# Patient Record
Sex: FEMALE | Race: WHITE | NOT HISPANIC OR LATINO | Employment: FULL TIME | URBAN - NONMETROPOLITAN AREA
[De-identification: names, ages, dates, MRNs, and addresses within clinical notes are randomized per-mention and may not be internally consistent; named-entity substitution may affect disease eponyms.]

---

## 2019-11-12 ENCOUNTER — HOSPITAL ENCOUNTER (OUTPATIENT)
Dept: GENERAL RADIOLOGY | Facility: HOSPITAL | Age: 59
Discharge: HOME | End: 2019-11-12
Admitting: NURSE PRACTITIONER

## 2019-11-12 PROCEDURE — 73140 X-RAY EXAM OF FINGER(S): CPT

## 2021-10-08 ENCOUNTER — TELEPHONE (OUTPATIENT)
Dept: NEUROSURGERY | Age: 61
End: 2021-10-08

## 2021-10-11 ENCOUNTER — TELEPHONE (OUTPATIENT)
Dept: NEUROSURGERY | Age: 61
End: 2021-10-11

## 2021-10-11 NOTE — TELEPHONE ENCOUNTER
[facility] New Patient Questionnaire    1. Diagnosis/Reason for Referral?    Back pain    2. Who is completing questionnaire? Patient  x Caregiver Family      3. Has the patient had any previous spinal/brain surgeries? no        A. If yes, what is the name of the facility in which the surgery was performed? B. Procedure/Surgery performed? C. Who was the surgeon? D. When was the surgery? MM/YY       E. Did the patient improve after the surgery? 4. Is this a second opinion? If yes, Dr. Thu Valverde would like to review patient first before making the appointment. 5. Have MRI Images been obtain within the last year? Yes x No      XR  CT     If yes, where was the imaging performed? [facility]     If yes, what part of the body? Lumbar x Cervical  Thoracic  Brain     If yes, when was it obtained? 9-30-21    Note: if the scan was performed at a facility other than [facility], the disc will need to be brought to the appointment or we need to reach out to obtain the disc. A. Was the patient instructed to provide the disc? Yes  x No      8. Has the patient had a NCV/EMG within the last year? Yes  No x     If yes, where was it performed and date? MM/YY  Location:      9. Has the patient been to Physical Therapy? Yes x  No      If yes, what location, how long attended, and last visit? Location:  [facility]        Therapy Lasted: 6 visits   Date of Last Visit: 2 weeks ago from 10-11-21      10. Has the patient been to Pain Management? Yes x  No     If yes, what location and last visit     Location: [facility]   Last Visit: October 6 ,2021        Is it Helping? NO   Is it helping?

## 2021-10-14 ENCOUNTER — OFFICE VISIT (OUTPATIENT)
Dept: NEUROSURGERY | Age: 61
End: 2021-10-14
Payer: COMMERCIAL

## 2021-10-14 ENCOUNTER — TELEPHONE (OUTPATIENT)
Dept: NEUROSURGERY | Age: 61
End: 2021-10-14

## 2021-10-14 VITALS
SYSTOLIC BLOOD PRESSURE: 113 MMHG | HEART RATE: 90 BPM | BODY MASS INDEX: 23.55 KG/M2 | WEIGHT: 128 LBS | HEIGHT: 62 IN | DIASTOLIC BLOOD PRESSURE: 74 MMHG

## 2021-10-14 DIAGNOSIS — Z01.818 PRE-OP EXAMINATION: ICD-10-CM

## 2021-10-14 DIAGNOSIS — M79.651 RIGHT THIGH PAIN: ICD-10-CM

## 2021-10-14 DIAGNOSIS — M43.16 SPONDYLOLISTHESIS AT L4-L5 LEVEL: ICD-10-CM

## 2021-10-14 DIAGNOSIS — M79.18 RIGHT BUTTOCK PAIN: ICD-10-CM

## 2021-10-14 DIAGNOSIS — M48.061 STENOSIS OF LATERAL RECESS OF LUMBAR SPINE: Primary | ICD-10-CM

## 2021-10-14 DIAGNOSIS — M51.36 DDD (DEGENERATIVE DISC DISEASE), LUMBAR: ICD-10-CM

## 2021-10-14 DIAGNOSIS — M48.061 SPINAL STENOSIS OF LUMBAR REGION WITHOUT NEUROGENIC CLAUDICATION: ICD-10-CM

## 2021-10-14 PROCEDURE — 99205 OFFICE O/P NEW HI 60 MIN: CPT | Performed by: NURSE PRACTITIONER

## 2021-10-14 RX ORDER — METOPROLOL SUCCINATE 50 MG/1
100 TABLET, EXTENDED RELEASE ORAL DAILY
COMMUNITY

## 2021-10-14 RX ORDER — LISINOPRIL 20 MG/1
20 TABLET ORAL DAILY
COMMUNITY

## 2021-10-14 RX ORDER — CYCLOBENZAPRINE HCL 5 MG
5 TABLET ORAL 3 TIMES DAILY PRN
COMMUNITY
End: 2021-10-27

## 2021-10-14 RX ORDER — HYDROCODONE BITARTRATE AND ACETAMINOPHEN 5; 325 MG/1; MG/1
1 TABLET ORAL 3 TIMES DAILY
COMMUNITY
End: 2021-10-27

## 2021-10-14 RX ORDER — TRAMADOL HYDROCHLORIDE 50 MG/1
50 TABLET ORAL EVERY 6 HOURS PRN
COMMUNITY

## 2021-10-14 RX ORDER — CITALOPRAM 20 MG/1
20 TABLET ORAL DAILY
COMMUNITY

## 2021-10-14 RX ORDER — DICLOFENAC SODIUM 75 MG/1
75 TABLET, DELAYED RELEASE ORAL 2 TIMES DAILY
COMMUNITY

## 2021-10-14 RX ORDER — GABAPENTIN 300 MG/1
300 CAPSULE ORAL 2 TIMES DAILY
COMMUNITY

## 2021-10-14 RX ORDER — OMEPRAZOLE 20 MG/1
20 CAPSULE, DELAYED RELEASE ORAL DAILY
COMMUNITY

## 2021-10-14 RX ORDER — SODIUM CHLORIDE 0.9 % (FLUSH) 0.9 %
10 SYRINGE (ML) INJECTION EVERY 12 HOURS SCHEDULED
Status: CANCELLED | OUTPATIENT
Start: 2021-10-14

## 2021-10-14 RX ORDER — AMITRIPTYLINE HYDROCHLORIDE 75 MG/1
75 TABLET, FILM COATED ORAL NIGHTLY
COMMUNITY

## 2021-10-14 RX ORDER — SODIUM CHLORIDE 9 MG/ML
25 INJECTION, SOLUTION INTRAVENOUS PRN
Status: CANCELLED | OUTPATIENT
Start: 2021-10-14

## 2021-10-14 RX ORDER — ATORVASTATIN CALCIUM 20 MG/1
20 TABLET, FILM COATED ORAL DAILY
COMMUNITY

## 2021-10-14 RX ORDER — SODIUM CHLORIDE 0.9 % (FLUSH) 0.9 %
10 SYRINGE (ML) INJECTION PRN
Status: CANCELLED | OUTPATIENT
Start: 2021-10-14

## 2021-10-14 RX ORDER — TEMAZEPAM 15 MG/1
15 CAPSULE ORAL NIGHTLY PRN
COMMUNITY

## 2021-10-14 ASSESSMENT — ENCOUNTER SYMPTOMS
EYES NEGATIVE: 1
GASTROINTESTINAL NEGATIVE: 1
RESPIRATORY NEGATIVE: 1
BACK PAIN: 1

## 2021-10-14 NOTE — H&P
[facility]  H&P      Chief Complaint   Patient presents with    Follow-up     back pain. HISTORY OF PRESENT ILLNESS:    Tiffany Oropeza is a 56 y.o. female who presents with right buttock pain that has been ongoing for about a year and a half but worsening. The pain does radiate into the RIGHT anterior thigh. Her pain is mostly located in the RLE. No real back pain. The patient denies numbness. Any sort of physical activity exacerbates the pain. Her pain is worsened when going from a seated to standing position. Her pain is worsened with walking. Her pain is not changed when lying flat. Overall, indicative that the patient does not have a mechanical nature to their pain. The patient states that she can no longer walk for prolonged periods of time which has dramatically affected her quality of life. The patient has underwent a non-operative treatment course that has included:  NSAIDs (diclofenac)  Muscle Relaxers (flexeril)  Gabapentin  Opiates (tramadol)  Physical Therapy   Epidural Steroid Injections   Chiropractic therapy       Of note she does not use tobacco and does take blood thinning medications (ASA). History reviewed. No pertinent past medical history. History reviewed. No pertinent surgical history.     Current Outpatient Medications   Medication Sig Dispense Refill    metoprolol succinate (TOPROL XL) 50 MG extended release tablet Take 100 mg by mouth 2 times daily      omeprazole (PRILOSEC) 20 MG delayed release capsule Take 20 mg by mouth daily      lisinopril (PRINIVIL;ZESTRIL) 20 MG tablet Take 20 mg by mouth daily      diclofenac (VOLTAREN) 75 MG EC tablet Take 75 mg by mouth 2 times daily      citalopram (CELEXA) 20 MG tablet Take 20 mg by mouth daily      atorvastatin (LIPITOR) 20 MG tablet Take 20 mg by mouth daily      amitriptyline (ELAVIL) 75 MG tablet Take 75 mg by mouth nightly      traMADol (ULTRAM) 50 MG tablet Take 50 mg by mouth every 6 hours as needed for Pain.  temazepam (RESTORIL) 15 MG capsule Take 15 mg by mouth nightly as needed for Sleep.  gabapentin (NEURONTIN) 300 MG capsule Take 300 mg by mouth 2 times daily.  HYDROcodone-acetaminophen (NORCO) 5-325 MG per tablet Take 1 tablet by mouth three times daily.  cyclobenzaprine (FLEXERIL) 5 MG tablet Take 5 mg by mouth 3 times daily as needed for Muscle spasms       No current facility-administered medications for this visit. Allergies:  Patient has no known allergies. Social History:   Social History     Tobacco Use   Smoking Status Never Smoker   Smokeless Tobacco Never Used     Social History     Substance and Sexual Activity   Alcohol Use None         Family History:   History reviewed. No pertinent family history. REVIEW OF SYSTEMS:  Constitutional: Negative. HENT: Negative. Eyes: Negative. Respiratory: Negative. Cardiovascular: Negative. Gastrointestinal: Negative. Genitourinary: Negative. Musculoskeletal: Positive for back pain. Skin: Negative. Neurological: Negative. Endo/Heme/Allergies: Negative. Psychiatric/Behavioral: Negative. PHYSICAL EXAM:  Vitals:    10/14/21 1314   BP: 113/74   Pulse: 90     Constitutional: appears well-developed and well-nourished. Eyes - conjunctiva normal.  Pupils react to light  Ear, nose, throat - hearing intact to finger rub, No scars, masses, or lesions over external nose or ears, no atrophy oftongue  Neck- symmetric, no masses noted, no jugular vein distension  Respiration- chest wall appears symmetric, good expansion, normal effort without use of accessory muscles  Musculoskeletal - no significant wasting of muscles noted, no bony deformities, gait no gross ataxia  Extremities- no clubbing, cyanosis oredema  Skin - warm, dry, and intact. No rash, erythema, or pallor.   Psychiatric - mood, affect, and behavior appear normal.     Neurologic Examination  Awake, Alert and oriented x 4  Normal speech pattern, following commands    Motor:  RIGHT:     iliopsoas 5/5    knee flexor 5/5    knee extension 5/5    EHL/dorsiflexion 5/5    plantar flexion 5/5    LEFT:       iliopsoas 5/5    knee flexor 5/5    knee extension 5/5    EHL/dorsiflexion 5/5    plantar flexion 5/5    No deficits to light touch or pinprick sensation  Reflexes are 2+ and symmetric  No myofacial tenderness to palpation  Normal Gait pattern        DATA and IMAGING:    Nursing/pcp notes, imaging, labs, and vitals reviewed. PT,OT and/or speech notes reviewed    No results found for: WBC, HGB, HCT, MCV, PLTNo results found for: NA, K, CL, CO2, BUN, CREATININE, GLUCOSE, CALCIUM, PROT, LABALBU, BILITOT, ALKPHOS, AST, ALT, LABGLOM, GFRAA, AGRATIO, GLOBNo results found for: INR, PROTIME      MRI Lumbar Spine (9/29/2021)[facility]  I have personally reviewed these images and my interpretation is:  DDD throughout  L2-3 broad base disc protrusion that results in moderate canal stenosis, severe right lateral recess stenosis and likely compression of the RIGHT L3 nerve root   L4-5 small spondylolisthesis that measures 3-4mm, mild to moderate right foraminal stenosis, mild canal stenosis. Possible pars fracture noted at this level on the RIGHT. ASSESSMENT:    Patricia Castillo is a 56 y.o. female with complaints of RIGHT buttock and anterior thigh pain that correlates with her canal and right lateral recess stenosis at L2-3. ICD-10-CM    1. Stenosis of lateral recess of lumbar spine  M48.061    2. Spinal stenosis of lumbar region without neurogenic claudication  M48.061    3. Spondylolisthesis at L4-L5 level  M43.16    4. DDD (degenerative disc disease), lumbar  M51.36    5. Right buttock pain  M79.18    6. Right thigh pain  M79.651        PLAN:  We have discussed and reviewed the results of the MRI lumbar spine with Ms. Buck at length.   We explained that she does have some narrowing more so on the RIGHT at L2-3 that correlates with her right thigh pain. At this point, she has undergone a multitude of non-operative treatments without much relief and because of this we will offer her a small decompression. She will need a RIGHT L2-3 decompressive laminectomy using minimally invasive technique (Nov 3) No rep needed     She will need to stop her ASA at least 5-7 days prior to surgery and may resume following     We discussed risks, complications, and expectations, including but not limited to infection, paralysis, bowel and bladder dysfunction, possible need for revision procedure, persistent pain, spinal fluid leak, stroke and death. In addition, the benefits of the surgery were thoroughly discussed and the patient demonstrated a deep understanding. The patient wishes to proceed with surgical intervention. We will schedule for surgery in the near future. The patient was counseled at length about the risks of no Covid-19 during their perioperative period and any recovery window from their procedure. The patient was made aware that no Covid-19  may worsen their prognosis for recovering from their procedure  and lend to a higher morbidity and/or mortality risk. All material risks, benefits, and reasonable alternatives including postponing the procedure were discussed. The patient does wish to proceed with the procedure at this time.           Lamont Figueroa, APRN

## 2021-10-14 NOTE — H&P (VIEW-ONLY)
mouth every 6 hours as needed for Pain.  temazepam (RESTORIL) 15 MG capsule Take 15 mg by mouth nightly as needed for Sleep.  gabapentin (NEURONTIN) 300 MG capsule Take 300 mg by mouth 2 times daily.  HYDROcodone-acetaminophen (NORCO) 5-325 MG per tablet Take 1 tablet by mouth three times daily.  cyclobenzaprine (FLEXERIL) 5 MG tablet Take 5 mg by mouth 3 times daily as needed for Muscle spasms       No current facility-administered medications for this visit. Allergies:  Patient has no known allergies. Social History:   Social History     Tobacco Use   Smoking Status Never Smoker   Smokeless Tobacco Never Used     Social History     Substance and Sexual Activity   Alcohol Use None         Family History:   History reviewed. No pertinent family history. REVIEW OF SYSTEMS:  Constitutional: Negative. HENT: Negative. Eyes: Negative. Respiratory: Negative. Cardiovascular: Negative. Gastrointestinal: Negative. Genitourinary: Negative. Musculoskeletal: Positive for back pain. Skin: Negative. Neurological: Negative. Endo/Heme/Allergies: Negative. Psychiatric/Behavioral: Negative. PHYSICAL EXAM:  Vitals:    10/14/21 1314   BP: 113/74   Pulse: 90     Constitutional: appears well-developed and well-nourished. Eyes  conjunctiva normal.  Pupils react to light  Ear, nose, throat - hearing intact to finger rub, No scars, masses, or lesions over external nose or ears, no atrophy oftongue  Neck- symmetric, no masses noted, no jugular vein distension  Respiration- chest wall appears symmetric, good expansion, normal effort without use of accessory muscles  Musculoskeletal  no significant wasting of muscles noted, no bony deformities, gait no gross ataxia  Extremities- no clubbing, cyanosis oredema  Skin  warm, dry, and intact. No rash, erythema, or pallor.   Psychiatric  mood, affect, and behavior appear normal.     Neurologic Examination  Awake, Alert and oriented x 4  Normal speech pattern, following commands    Motor:  RIGHT:     iliopsoas 5/5    knee flexor 5/5    knee extension 5/5    EHL/dorsiflexion 5/5    plantar flexion 5/5    LEFT:       iliopsoas 5/5    knee flexor 5/5    knee extension 5/5    EHL/dorsiflexion 5/5    plantar flexion 5/5    No deficits to light touch or pinprick sensation  Reflexes are 2+ and symmetric  No myofacial tenderness to palpation  Normal Gait pattern        DATA and IMAGING:    Nursing/pcp notes, imaging, labs, and vitals reviewed. PT,OT and/or speech notes reviewed    No results found for: WBC, HGB, HCT, MCV, PLTNo results found for: NA, K, CL, CO2, BUN, CREATININE, GLUCOSE, CALCIUM, PROT, LABALBU, BILITOT, ALKPHOS, AST, ALT, LABGLOM, GFRAA, AGRATIO, GLOBNo results found for: INR, PROTIME      MRI Lumbar Spine (9/29/2021)[facility]  I have personally reviewed these images and my interpretation is:  DDD throughout  L2-3 broad base disc protrusion that results in moderate canal stenosis, severe right lateral recess stenosis and likely compression of the RIGHT L3 nerve root   L4-5 small spondylolisthesis that measures 3-4mm, mild to moderate right foraminal stenosis, mild canal stenosis. Possible pars fracture noted at this level on the RIGHT. ASSESSMENT:    Maksim Mcgee is a 56 y.o. female with complaints of RIGHT buttock and anterior thigh pain that correlates with her canal and right lateral recess stenosis at L2-3. ICD-10-CM    1. Stenosis of lateral recess of lumbar spine  M48.061    2. Spinal stenosis of lumbar region without neurogenic claudication  M48.061    3. Spondylolisthesis at L4-L5 level  M43.16    4. DDD (degenerative disc disease), lumbar  M51.36    5. Right buttock pain  M79.18    6. Right thigh pain  M79.651        PLAN:  We have discussed and reviewed the results of the MRI lumbar spine with Ms. Buck at length.   We explained that she does have some narrowing more so on the RIGHT at L2-3 that correlates with her right thigh pain. At this point, she has undergone a multitude of non-operative treatments without much relief and because of this we will offer her a small decompression. She will need a RIGHT L2-3 decompressive laminectomy using minimally invasive technique (Nov 3) No rep needed     She will need to stop her ASA at least 5-7 days prior to surgery and may resume following     We discussed risks, complications, and expectations, including but not limited to infection, paralysis, bowel and bladder dysfunction, possible need for revision procedure, persistent pain, spinal fluid leak, stroke and death. In addition, the benefits of the surgery were thoroughly discussed and the patient demonstrated a deep understanding. The patient wishes to proceed with surgical intervention. We will schedule for surgery in the near future. The patient was counseled at length about the risks of no Covid-19 during their perioperative period and any recovery window from their procedure. The patient was made aware that no Covid-19  may worsen their prognosis for recovering from their procedure  and lend to a higher morbidity and/or mortality risk. All material risks, benefits, and reasonable alternatives including postponing the procedure were discussed. The patient does wish to proceed with the procedure at this time.           Jerod Starkey, APRN Statement Selected

## 2021-10-14 NOTE — TELEPHONE ENCOUNTER
Have made several attempts to obtain Pain Management records. Called 10-13-21 twice, and 10-14-21 twice. Was told that the nurse had to print them off before they could be sent. Will attempt again before patients appointment.

## 2021-10-14 NOTE — PROGRESS NOTES
[facility]  Office Visit      Chief Complaint   Patient presents with    Follow-up     back pain. HISTORY OF PRESENT ILLNESS:    Lurdes Orlando is a 56 y.o. female who presents with right buttock pain that has been ongoing for about a year and a half but worsening. The pain does radiate into the RIGHT anterior thigh. Her pain is mostly located in the RLE. No real back pain. The patient denies numbness. Any sort of physical activity exacerbates the pain. Her pain is worsened when going from a seated to standing position. Her pain is worsened with walking. Her pain is not changed when lying flat. Overall, indicative that the patient does not have a mechanical nature to their pain. The patient states that she can no longer walk for prolonged periods of time which has dramatically affected her quality of life. The patient has underwent a non-operative treatment course that has included:  NSAIDs (diclofenac)  Muscle Relaxers (flexeril)  Gabapentin  Opiates (tramadol)  Physical Therapy   Epidural Steroid Injections   Chiropractic therapy       Of note she does not use tobacco and does take blood thinning medications (ASA). History reviewed. No pertinent past medical history. History reviewed. No pertinent surgical history.     Current Outpatient Medications   Medication Sig Dispense Refill    metoprolol succinate (TOPROL XL) 50 MG extended release tablet Take 100 mg by mouth 2 times daily      omeprazole (PRILOSEC) 20 MG delayed release capsule Take 20 mg by mouth daily      lisinopril (PRINIVIL;ZESTRIL) 20 MG tablet Take 20 mg by mouth daily      diclofenac (VOLTAREN) 75 MG EC tablet Take 75 mg by mouth 2 times daily      citalopram (CELEXA) 20 MG tablet Take 20 mg by mouth daily      atorvastatin (LIPITOR) 20 MG tablet Take 20 mg by mouth daily      amitriptyline (ELAVIL) 75 MG tablet Take 75 mg by mouth nightly      traMADol (ULTRAM) 50 MG tablet Take 50 mg by mouth every 6 hours as needed for Pain.  temazepam (RESTORIL) 15 MG capsule Take 15 mg by mouth nightly as needed for Sleep.  gabapentin (NEURONTIN) 300 MG capsule Take 300 mg by mouth 2 times daily.  HYDROcodone-acetaminophen (NORCO) 5-325 MG per tablet Take 1 tablet by mouth three times daily.  cyclobenzaprine (FLEXERIL) 5 MG tablet Take 5 mg by mouth 3 times daily as needed for Muscle spasms       No current facility-administered medications for this visit. Allergies:  Patient has no known allergies. Social History:   Social History     Tobacco Use   Smoking Status Never Smoker   Smokeless Tobacco Never Used     Social History     Substance and Sexual Activity   Alcohol Use None         Family History:   History reviewed. No pertinent family history. REVIEW OF SYSTEMS:  Constitutional: Negative. HENT: Negative. Eyes: Negative. Respiratory: Negative. Cardiovascular: Negative. Gastrointestinal: Negative. Genitourinary: Negative. Musculoskeletal: Positive for back pain. Skin: Negative. Neurological: Negative. Endo/Heme/Allergies: Negative. Psychiatric/Behavioral: Negative. PHYSICAL EXAM:  Vitals:    10/14/21 1314   BP: 113/74   Pulse: 90     Constitutional: appears well-developed and well-nourished. Eyes - conjunctiva normal.  Pupils react to light  Ear, nose, throat - hearing intact to finger rub, No scars, masses, or lesions over external nose or ears, no atrophy oftongue  Neck- symmetric, no masses noted, no jugular vein distension  Respiration- chest wall appears symmetric, good expansion, normal effort without use of accessory muscles  Musculoskeletal - no significant wasting of muscles noted, no bony deformities, gait no gross ataxia  Extremities- no clubbing, cyanosis oredema  Skin - warm, dry, and intact. No rash, erythema, or pallor.   Psychiatric - mood, affect, and behavior appear normal.     Neurologic Examination  Awake, Alert L2-3 that correlates with her right thigh pain. At this point, she has undergone a multitude of non-operative treatments without much relief and because of this we will offer her a small decompression. She will need a RIGHT L2-3 decompressive laminectomy using minimally invasive technique (Nov 3) No rep needed     She will need to stop her ASA at least 5-7 days prior to surgery and may resume following     We discussed risks, complications, and expectations, including but not limited to infection, paralysis, bowel and bladder dysfunction, possible need for revision procedure, persistent pain, spinal fluid leak, stroke and death. In addition, the benefits of the surgery were thoroughly discussed and the patient demonstrated a deep understanding. The patient wishes to proceed with surgical intervention. We will schedule for surgery in the near future. The patient was counseled at length about the risks of no Covid-19 during their perioperative period and any recovery window from their procedure. The patient was made aware that no Covid-19  may worsen their prognosis for recovering from their procedure  and lend to a higher morbidity and/or mortality risk. All material risks, benefits, and reasonable alternatives including postponing the procedure were discussed. The patient does wish to proceed with the procedure at this time.           WALDO Tony

## 2021-10-19 ENCOUNTER — TELEPHONE (OUTPATIENT)
Dept: NEUROSURGERY | Age: 61
End: 2021-10-19

## 2021-10-27 ENCOUNTER — HOSPITAL ENCOUNTER (OUTPATIENT)
Dept: PREADMISSION TESTING | Age: 61
Discharge: HOME | End: 2021-10-31
Payer: MEDICAID

## 2021-10-27 ENCOUNTER — HOSPITAL ENCOUNTER (OUTPATIENT)
Dept: GENERAL RADIOLOGY | Age: 61
Discharge: HOME | End: 2021-10-27
Payer: MEDICAID

## 2021-10-27 DIAGNOSIS — M48.061 SPINAL STENOSIS OF LUMBAR REGION WITHOUT NEUROGENIC CLAUDICATION: ICD-10-CM

## 2021-10-27 DIAGNOSIS — M48.061 STENOSIS OF LATERAL RECESS OF LUMBAR SPINE: ICD-10-CM

## 2021-10-27 DIAGNOSIS — Z01.818 PRE-OP EXAMINATION: ICD-10-CM

## 2021-10-27 LAB
ABO/RH: NORMAL
ALBUMIN SERPL-MCNC: 3.8 G/DL (ref 3.5–5.2)
ALP BLD-CCNC: 116 U/L (ref 35–104)
ALT SERPL-CCNC: 19 U/L (ref 5–33)
ANION GAP SERPL CALCULATED.3IONS-SCNC: 9 MMOL/L (ref 7–19)
ANTIBODY SCREEN: NORMAL
APTT: 27.3 SEC (ref 26–36.2)
AST SERPL-CCNC: 15 U/L (ref 5–32)
BILIRUB SERPL-MCNC: <0.2 MG/DL (ref 0.2–1.2)
BILIRUBIN URINE: NEGATIVE
BLOOD, URINE: NEGATIVE
BUN BLDV-MCNC: 10 MG/DL (ref 8–23)
CALCIUM SERPL-MCNC: 9.6 MG/DL (ref 8.8–10.2)
CHLORIDE BLD-SCNC: 104 MMOL/L (ref 98–111)
CLARITY: CLEAR
CO2: 28 MMOL/L (ref 22–29)
COLOR: YELLOW
CREAT SERPL-MCNC: 0.8 MG/DL (ref 0.5–0.9)
EKG P AXIS: 73 DEGREES
EKG P-R INTERVAL: 142 MS
EKG Q-T INTERVAL: 384 MS
EKG QRS DURATION: 120 MS
EKG QTC CALCULATION (BAZETT): 422 MS
EKG T AXIS: 41 DEGREES
GFR AFRICAN AMERICAN: >59
GFR NON-AFRICAN AMERICAN: >60
GLUCOSE BLD-MCNC: 95 MG/DL (ref 74–109)
GLUCOSE URINE: NEGATIVE MG/DL
HCT VFR BLD CALC: 41.6 % (ref 37–47)
HEMOGLOBIN: 13 G/DL (ref 12–16)
INR BLD: 0.85 (ref 0.88–1.18)
KETONES, URINE: NEGATIVE MG/DL
LEUKOCYTE ESTERASE, URINE: NEGATIVE
MCH RBC QN AUTO: 30.6 PG (ref 27–31)
MCHC RBC AUTO-ENTMCNC: 31.3 G/DL (ref 33–37)
MCV RBC AUTO: 97.9 FL (ref 81–99)
NITRITE, URINE: NEGATIVE
PDW BLD-RTO: 14 % (ref 11.5–14.5)
PH UA: 7 (ref 5–8)
PLATELET # BLD: 311 K/UL (ref 130–400)
PMV BLD AUTO: 9.6 FL (ref 9.4–12.3)
POTASSIUM SERPL-SCNC: 4.9 MMOL/L (ref 3.5–5)
PROTEIN UA: NEGATIVE MG/DL
PROTHROMBIN TIME: 11.9 SEC (ref 12–14.6)
RBC # BLD: 4.25 M/UL (ref 4.2–5.4)
SODIUM BLD-SCNC: 141 MMOL/L (ref 136–145)
SPECIFIC GRAVITY UA: 1.01 (ref 1–1.03)
TOTAL PROTEIN: 6.6 G/DL (ref 6.6–8.7)
UROBILINOGEN, URINE: 1 E.U./DL
WBC # BLD: 7 K/UL (ref 4.8–10.8)

## 2021-10-27 PROCEDURE — 86901 BLOOD TYPING SEROLOGIC RH(D): CPT

## 2021-10-27 PROCEDURE — 81003 URINALYSIS AUTO W/O SCOPE: CPT

## 2021-10-27 PROCEDURE — 93005 ELECTROCARDIOGRAM TRACING: CPT

## 2021-10-27 PROCEDURE — 80053 COMPREHEN METABOLIC PANEL: CPT

## 2021-10-27 PROCEDURE — 85730 THROMBOPLASTIN TIME PARTIAL: CPT

## 2021-10-27 PROCEDURE — 71046 X-RAY EXAM CHEST 2 VIEWS: CPT

## 2021-10-27 PROCEDURE — 86850 RBC ANTIBODY SCREEN: CPT

## 2021-10-27 PROCEDURE — 85610 PROTHROMBIN TIME: CPT

## 2021-10-27 PROCEDURE — 85027 COMPLETE CBC AUTOMATED: CPT

## 2021-10-27 PROCEDURE — 86900 BLOOD TYPING SEROLOGIC ABO: CPT

## 2021-10-28 ENCOUNTER — TELEPHONE (OUTPATIENT)
Dept: NEUROSURGERY | Age: 61
End: 2021-10-28

## 2021-10-28 NOTE — TELEPHONE ENCOUNTER
----- Message from WALDO Domingo sent at 10/28/2021  1:51 PM CDT -----  Had cardiology review EKG and states it was NS with RBBB. [id number] [address] for surgery.

## 2021-11-03 ENCOUNTER — ANESTHESIA EVENT (OUTPATIENT)
Dept: OPERATING ROOM | Age: 61
End: 2021-11-03
Payer: COMMERCIAL

## 2021-11-03 ENCOUNTER — HOSPITAL ENCOUNTER (OUTPATIENT)
Age: 61
Setting detail: OUTPATIENT SURGERY
Discharge: HOME | End: 2021-11-03
Attending: NEUROLOGICAL SURGERY | Admitting: NEUROLOGICAL SURGERY
Payer: COMMERCIAL

## 2021-11-03 ENCOUNTER — ANESTHESIA (OUTPATIENT)
Dept: OPERATING ROOM | Age: 61
End: 2021-11-03
Payer: COMMERCIAL

## 2021-11-03 VITALS
HEART RATE: 87 BPM | TEMPERATURE: 97.2 F | OXYGEN SATURATION: 95 % | HEIGHT: 62 IN | WEIGHT: 128 LBS | RESPIRATION RATE: 16 BRPM | SYSTOLIC BLOOD PRESSURE: 141 MMHG | DIASTOLIC BLOOD PRESSURE: 67 MMHG | BODY MASS INDEX: 23.55 KG/M2

## 2021-11-03 VITALS — TEMPERATURE: 97.3 F | OXYGEN SATURATION: 96 % | DIASTOLIC BLOOD PRESSURE: 59 MMHG | SYSTOLIC BLOOD PRESSURE: 141 MMHG

## 2021-11-03 DIAGNOSIS — G89.18 POST-OPERATIVE PAIN: Primary | ICD-10-CM

## 2021-11-03 LAB
INR BLD: 0.87 (ref 0.88–1.18)
PROTHROMBIN TIME: 12.1 SEC (ref 12–14.6)

## 2021-11-03 PROCEDURE — C1769 GUIDE WIRE: HCPCS | Performed by: NEUROLOGICAL SURGERY

## 2021-11-03 PROCEDURE — 7100000011 HC PHASE II RECOVERY - ADDTL 15 MIN: Performed by: NEUROLOGICAL SURGERY

## 2021-11-03 PROCEDURE — 7100000000 HC PACU RECOVERY - FIRST 15 MIN: Performed by: NEUROLOGICAL SURGERY

## 2021-11-03 PROCEDURE — 3700000001 HC ADD 15 MINUTES (ANESTHESIA): Performed by: NEUROLOGICAL SURGERY

## 2021-11-03 PROCEDURE — 63048 LAM FACETEC &FORAMOT EA ADDL: CPT | Performed by: NEUROLOGICAL SURGERY

## 2021-11-03 PROCEDURE — 6360000002 HC RX W HCPCS: Performed by: NURSE ANESTHETIST, CERTIFIED REGISTERED

## 2021-11-03 PROCEDURE — 2720000010 HC SURG SUPPLY STERILE: Performed by: NEUROLOGICAL SURGERY

## 2021-11-03 PROCEDURE — 2709999900 HC NON-CHARGEABLE SUPPLY: Performed by: NEUROLOGICAL SURGERY

## 2021-11-03 PROCEDURE — 63047 LAM FACETEC & FORAMOT LUMBAR: CPT | Performed by: NEUROLOGICAL SURGERY

## 2021-11-03 PROCEDURE — 6360000002 HC RX W HCPCS: Performed by: NURSE PRACTITIONER

## 2021-11-03 PROCEDURE — 85610 PROTHROMBIN TIME: CPT

## 2021-11-03 PROCEDURE — 6370000000 HC RX 637 (ALT 250 FOR IP): Performed by: NEUROLOGICAL SURGERY

## 2021-11-03 PROCEDURE — 3700000000 HC ANESTHESIA ATTENDED CARE: Performed by: NEUROLOGICAL SURGERY

## 2021-11-03 PROCEDURE — 6360000002 HC RX W HCPCS: Performed by: NEUROLOGICAL SURGERY

## 2021-11-03 PROCEDURE — 7100000001 HC PACU RECOVERY - ADDTL 15 MIN: Performed by: NEUROLOGICAL SURGERY

## 2021-11-03 PROCEDURE — 2500000003 HC RX 250 WO HCPCS: Performed by: NURSE ANESTHETIST, CERTIFIED REGISTERED

## 2021-11-03 PROCEDURE — 7100000010 HC PHASE II RECOVERY - FIRST 15 MIN: Performed by: NEUROLOGICAL SURGERY

## 2021-11-03 PROCEDURE — 6360000002 HC RX W HCPCS: Performed by: ANESTHESIOLOGY

## 2021-11-03 PROCEDURE — 2500000003 HC RX 250 WO HCPCS: Performed by: ANESTHESIOLOGY

## 2021-11-03 PROCEDURE — 2580000003 HC RX 258: Performed by: NURSE ANESTHETIST, CERTIFIED REGISTERED

## 2021-11-03 PROCEDURE — 36415 COLL VENOUS BLD VENIPUNCTURE: CPT

## 2021-11-03 PROCEDURE — 3600000015 HC SURGERY LEVEL 5 ADDTL 15MIN: Performed by: NEUROLOGICAL SURGERY

## 2021-11-03 PROCEDURE — 2580000003 HC RX 258: Performed by: NEUROLOGICAL SURGERY

## 2021-11-03 PROCEDURE — 3600000005 HC SURGERY LEVEL 5 BASE: Performed by: NEUROLOGICAL SURGERY

## 2021-11-03 RX ORDER — HYDROMORPHONE HYDROCHLORIDE 1 MG/ML
0.25 INJECTION, SOLUTION INTRAMUSCULAR; INTRAVENOUS; SUBCUTANEOUS EVERY 5 MIN PRN
Status: DISCONTINUED | OUTPATIENT
Start: 2021-11-03 | End: 2021-11-03 | Stop reason: HOSPADM

## 2021-11-03 RX ORDER — SODIUM CHLORIDE 0.9 % (FLUSH) 0.9 %
10 SYRINGE (ML) INJECTION EVERY 12 HOURS SCHEDULED
Status: DISCONTINUED | OUTPATIENT
Start: 2021-11-03 | End: 2021-11-03 | Stop reason: HOSPADM

## 2021-11-03 RX ORDER — MIDAZOLAM HYDROCHLORIDE 1 MG/ML
INJECTION INTRAMUSCULAR; INTRAVENOUS PRN
Status: DISCONTINUED | OUTPATIENT
Start: 2021-11-03 | End: 2021-11-03 | Stop reason: SDUPTHER

## 2021-11-03 RX ORDER — METHYLPREDNISOLONE ACETATE 40 MG/ML
INJECTION, SUSPENSION INTRA-ARTICULAR; INTRALESIONAL; INTRAMUSCULAR; SOFT TISSUE PRN
Status: DISCONTINUED | OUTPATIENT
Start: 2021-11-03 | End: 2021-11-03 | Stop reason: ALTCHOICE

## 2021-11-03 RX ORDER — ONDANSETRON 2 MG/ML
INJECTION INTRAMUSCULAR; INTRAVENOUS PRN
Status: DISCONTINUED | OUTPATIENT
Start: 2021-11-03 | End: 2021-11-03 | Stop reason: SDUPTHER

## 2021-11-03 RX ORDER — SODIUM CHLORIDE 0.9 % (FLUSH) 0.9 %
10 SYRINGE (ML) INJECTION PRN
Status: DISCONTINUED | OUTPATIENT
Start: 2021-11-03 | End: 2021-11-03 | Stop reason: HOSPADM

## 2021-11-03 RX ORDER — SODIUM CHLORIDE, SODIUM LACTATE, POTASSIUM CHLORIDE, CALCIUM CHLORIDE 600; 310; 30; 20 MG/100ML; MG/100ML; MG/100ML; MG/100ML
INJECTION, SOLUTION INTRAVENOUS CONTINUOUS PRN
Status: DISCONTINUED | OUTPATIENT
Start: 2021-11-03 | End: 2021-11-03 | Stop reason: SDUPTHER

## 2021-11-03 RX ORDER — HYDROMORPHONE HYDROCHLORIDE 1 MG/ML
0.5 INJECTION, SOLUTION INTRAMUSCULAR; INTRAVENOUS; SUBCUTANEOUS EVERY 5 MIN PRN
Status: DISCONTINUED | OUTPATIENT
Start: 2021-11-03 | End: 2021-11-03 | Stop reason: HOSPADM

## 2021-11-03 RX ORDER — HYDROCODONE BITARTRATE AND ACETAMINOPHEN 5; 325 MG/1; MG/1
1 TABLET ORAL EVERY 6 HOURS PRN
Qty: 12 TABLET | Refills: 0 | Status: SHIPPED | OUTPATIENT
Start: 2021-11-03 | End: 2021-11-10 | Stop reason: SDUPTHER

## 2021-11-03 RX ORDER — SODIUM CHLORIDE 9 MG/ML
25 INJECTION, SOLUTION INTRAVENOUS PRN
Status: DISCONTINUED | OUTPATIENT
Start: 2021-11-03 | End: 2021-11-03 | Stop reason: HOSPADM

## 2021-11-03 RX ORDER — DEXAMETHASONE SODIUM PHOSPHATE 4 MG/ML
4 INJECTION, SOLUTION INTRA-ARTICULAR; INTRALESIONAL; INTRAMUSCULAR; INTRAVENOUS; SOFT TISSUE ONCE
Status: COMPLETED | OUTPATIENT
Start: 2021-11-03 | End: 2021-11-03

## 2021-11-03 RX ORDER — EPHEDRINE SULFATE 50 MG/ML
INJECTION, SOLUTION INTRAVENOUS PRN
Status: DISCONTINUED | OUTPATIENT
Start: 2021-11-03 | End: 2021-11-03 | Stop reason: SDUPTHER

## 2021-11-03 RX ORDER — CEFAZOLIN SODIUM 2 G/100ML
2000 INJECTION, SOLUTION INTRAVENOUS
Status: COMPLETED | OUTPATIENT
Start: 2021-11-03 | End: 2021-11-03

## 2021-11-03 RX ORDER — ONDANSETRON 2 MG/ML
4 INJECTION INTRAMUSCULAR; INTRAVENOUS
Status: COMPLETED | OUTPATIENT
Start: 2021-11-03 | End: 2021-11-03

## 2021-11-03 RX ORDER — LIDOCAINE HYDROCHLORIDE 10 MG/ML
INJECTION, SOLUTION EPIDURAL; INFILTRATION; INTRACAUDAL; PERINEURAL PRN
Status: DISCONTINUED | OUTPATIENT
Start: 2021-11-03 | End: 2021-11-03 | Stop reason: SDUPTHER

## 2021-11-03 RX ORDER — FENTANYL CITRATE 50 UG/ML
INJECTION, SOLUTION INTRAMUSCULAR; INTRAVENOUS PRN
Status: DISCONTINUED | OUTPATIENT
Start: 2021-11-03 | End: 2021-11-03 | Stop reason: SDUPTHER

## 2021-11-03 RX ORDER — HYDROCODONE BITARTRATE AND ACETAMINOPHEN 5; 325 MG/1; MG/1
1 TABLET ORAL
Status: COMPLETED | OUTPATIENT
Start: 2021-11-03 | End: 2021-11-03

## 2021-11-03 RX ORDER — FENTANYL CITRATE 50 UG/ML
50 INJECTION, SOLUTION INTRAMUSCULAR; INTRAVENOUS EVERY 5 MIN PRN
Status: DISCONTINUED | OUTPATIENT
Start: 2021-11-03 | End: 2021-11-03 | Stop reason: HOSPADM

## 2021-11-03 RX ORDER — ROCURONIUM BROMIDE 10 MG/ML
INJECTION, SOLUTION INTRAVENOUS PRN
Status: DISCONTINUED | OUTPATIENT
Start: 2021-11-03 | End: 2021-11-03 | Stop reason: SDUPTHER

## 2021-11-03 RX ORDER — PROPOFOL 10 MG/ML
INJECTION, EMULSION INTRAVENOUS PRN
Status: DISCONTINUED | OUTPATIENT
Start: 2021-11-03 | End: 2021-11-03 | Stop reason: SDUPTHER

## 2021-11-03 RX ORDER — SODIUM CHLORIDE, SODIUM LACTATE, POTASSIUM CHLORIDE, CALCIUM CHLORIDE 600; 310; 30; 20 MG/100ML; MG/100ML; MG/100ML; MG/100ML
INJECTION, SOLUTION INTRAVENOUS CONTINUOUS
Status: DISCONTINUED | OUTPATIENT
Start: 2021-11-03 | End: 2021-11-03 | Stop reason: HOSPADM

## 2021-11-03 RX ADMIN — EPHEDRINE SULFATE 10 MG: 50 INJECTION INTRAMUSCULAR; INTRAVENOUS; SUBCUTANEOUS at 11:50

## 2021-11-03 RX ADMIN — SODIUM CHLORIDE, SODIUM LACTATE, POTASSIUM CHLORIDE, AND CALCIUM CHLORIDE: 600; 310; 30; 20 INJECTION, SOLUTION INTRAVENOUS at 11:56

## 2021-11-03 RX ADMIN — MIDAZOLAM 2 MG: 1 INJECTION INTRAMUSCULAR; INTRAVENOUS at 10:48

## 2021-11-03 RX ADMIN — SUGAMMADEX 8715 MG: 100 INJECTION, SOLUTION INTRAVENOUS at 12:29

## 2021-11-03 RX ADMIN — HYDROCODONE BITARTRATE AND ACETAMINOPHEN 1 TABLET: 5; 325 TABLET ORAL at 14:10

## 2021-11-03 RX ADMIN — ROCURONIUM BROMIDE 50 MG: 10 INJECTION, SOLUTION INTRAVENOUS at 10:53

## 2021-11-03 RX ADMIN — ONDANSETRON 4 MG: 2 INJECTION INTRAMUSCULAR; INTRAVENOUS at 14:11

## 2021-11-03 RX ADMIN — EPHEDRINE SULFATE 10 MG: 50 INJECTION INTRAMUSCULAR; INTRAVENOUS; SUBCUTANEOUS at 11:56

## 2021-11-03 RX ADMIN — PHENYLEPHRINE HYDROCHLORIDE 50 MCG: 10 INJECTION INTRAVENOUS at 11:38

## 2021-11-03 RX ADMIN — ONDANSETRON 4 MG: 2 INJECTION INTRAMUSCULAR; INTRAVENOUS at 12:09

## 2021-11-03 RX ADMIN — PHENYLEPHRINE HYDROCHLORIDE 100 MCG: 10 INJECTION INTRAVENOUS at 11:56

## 2021-11-03 RX ADMIN — PROPOFOL 40 MG: 10 INJECTION, EMULSION INTRAVENOUS at 12:36

## 2021-11-03 RX ADMIN — HYDROMORPHONE HYDROCHLORIDE 0.25 MG: 1 INJECTION, SOLUTION INTRAMUSCULAR; INTRAVENOUS; SUBCUTANEOUS at 13:16

## 2021-11-03 RX ADMIN — DEXAMETHASONE SODIUM PHOSPHATE 4 MG: 4 INJECTION, SOLUTION INTRAMUSCULAR; INTRAVENOUS at 08:39

## 2021-11-03 RX ADMIN — CEFAZOLIN SODIUM 2000 MG: 2 INJECTION, SOLUTION INTRAVENOUS at 10:52

## 2021-11-03 RX ADMIN — LIDOCAINE HYDROCHLORIDE 50 MG: 10 INJECTION, SOLUTION EPIDURAL; INFILTRATION; INTRACAUDAL; PERINEURAL at 10:53

## 2021-11-03 RX ADMIN — PROPOFOL 150 MG: 10 INJECTION, EMULSION INTRAVENOUS at 10:53

## 2021-11-03 RX ADMIN — SODIUM CHLORIDE, SODIUM LACTATE, POTASSIUM CHLORIDE, AND CALCIUM CHLORIDE: 600; 310; 30; 20 INJECTION, SOLUTION INTRAVENOUS at 10:48

## 2021-11-03 RX ADMIN — PHENYLEPHRINE HYDROCHLORIDE 50 MCG: 10 INJECTION INTRAVENOUS at 11:10

## 2021-11-03 RX ADMIN — FENTANYL CITRATE 100 MCG: 50 INJECTION, SOLUTION INTRAMUSCULAR; INTRAVENOUS at 10:53

## 2021-11-03 RX ADMIN — FAMOTIDINE 20 MG: 10 INJECTION, SOLUTION INTRAVENOUS at 08:38

## 2021-11-03 RX ADMIN — PHENYLEPHRINE HYDROCHLORIDE 100 MCG: 10 INJECTION INTRAVENOUS at 11:48

## 2021-11-03 RX ADMIN — HYDROMORPHONE HYDROCHLORIDE 0.25 MG: 1 INJECTION, SOLUTION INTRAMUSCULAR; INTRAVENOUS; SUBCUTANEOUS at 13:04

## 2021-11-03 RX ADMIN — ROCURONIUM BROMIDE 10 MG: 10 INJECTION, SOLUTION INTRAVENOUS at 11:58

## 2021-11-03 ASSESSMENT — PAIN DESCRIPTION - ORIENTATION: ORIENTATION: MID

## 2021-11-03 ASSESSMENT — PAIN SCALES - GENERAL
PAINLEVEL_OUTOF10: 8
PAINLEVEL_OUTOF10: 5

## 2021-11-03 ASSESSMENT — PAIN DESCRIPTION - PAIN TYPE
TYPE: SURGICAL PAIN

## 2021-11-03 ASSESSMENT — PAIN DESCRIPTION - LOCATION
LOCATION: BACK

## 2021-11-03 ASSESSMENT — PAIN DESCRIPTION - DESCRIPTORS: DESCRIPTORS: DISCOMFORT

## 2021-11-03 ASSESSMENT — PAIN - FUNCTIONAL ASSESSMENT: PAIN_FUNCTIONAL_ASSESSMENT: 0-10

## 2021-11-03 ASSESSMENT — LIFESTYLE VARIABLES: SMOKING_STATUS: 0

## 2021-11-03 NOTE — INTERVAL H&P NOTE
Update History & Physical    The patient's History and Physical of October 14, 2021 was reviewed with the patient and I examined the patient. There was no change. The surgical site was confirmed by the patient and me. Plan: The risks, benefits, expected outcome, and alternative to the recommended procedure have been discussed with the patient. Patient understands and wants to proceed with the procedure.      Electronically signed by Ruthann Valiente DO on 11/3/2021 at 10:30 AM

## 2021-11-03 NOTE — ANESTHESIA PRE PROCEDURE
Department of Anesthesiology  Preprocedure Note       Name:  Connor Senior   Age:  56 y.o.  :  1960                                          MRN:  [id number]         Date:  11/3/2021      Surgeon: Flaco Nichole):  Shantanu Hernandez DO    Procedure: Procedure(s):  RIGHT L2-3 decompressive laminectomy using minimally invasive technique    Medications prior to admission:   Prior to Admission medications    Medication Sig Start Date End Date Taking? Authorizing Provider   metoprolol succinate (TOPROL XL) 50 MG extended release tablet Take 100 mg by mouth daily     Historical Provider, MD   omeprazole (PRILOSEC) 20 MG delayed release capsule Take 20 mg by mouth daily    Historical Provider, MD   lisinopril (PRINIVIL;ZESTRIL) 20 MG tablet Take 20 mg by mouth daily    Historical Provider, MD   diclofenac (VOLTAREN) 75 MG EC tablet Take 75 mg by mouth 2 times daily    Historical Provider, MD   citalopram (CELEXA) 20 MG tablet Take 20 mg by mouth daily    Historical Provider, MD   atorvastatin (LIPITOR) 20 MG tablet Take 20 mg by mouth daily    Historical Provider, MD   amitriptyline (ELAVIL) 75 MG tablet Take 75 mg by mouth nightly    Historical Provider, MD   traMADol (ULTRAM) 50 MG tablet Take 50 mg by mouth every 6 hours as needed for Pain. Historical Provider, MD   temazepam (RESTORIL) 15 MG capsule Take 15 mg by mouth nightly as needed for Sleep. Historical Provider, MD   gabapentin (NEURONTIN) 300 MG capsule Take 300 mg by mouth 2 times daily.     Historical Provider, MD       Current medications:    Current Facility-Administered Medications   Medication Dose Route Frequency Provider Last Rate Last Admin    0.9 % sodium chloride infusion  25 mL IntraVENous PRN Peri Prader, APRN        ceFAZolin (ANCEF) 2000 mg in dextrose 4 % 100 mL IVPB (premix)  2,000 mg IntraVENous On Call to [address] [place] [address], APRN        sodium chloride flush 0.9 % injection 10 mL  10 mL IntraVENous 2 times per day Peri Prader, APRN  sodium chloride flush 0.9 % injection 10 mL  10 mL IntraVENous PRN WALDO Cullen           Allergies:  No Known Allergies    Problem List:  There is no problem list on file for this patient. Past Medical History:        Diagnosis Date    Hyperlipidemia     Hypertension        Past Surgical History:        Procedure Laterality Date     SECTION      CHOLECYSTECTOMY         Social History:    Social History     Tobacco Use    Smoking status: Never Smoker    Smokeless tobacco: Never Used   Substance Use Topics    Alcohol use: Not on file                                Counseling given: Not Answered      Vital Signs (Current): There were no vitals filed for this visit. BP Readings from Last 3 Encounters:   10/14/21 113/74       NPO Status:                                                                                 BMI:   Wt Readings from Last 3 Encounters:   10/14/21 128 lb (58.1 kg)     There is no height or weight on file to calculate BMI.    CBC:   Lab Results   Component Value Date    WBC 7.0 10/27/2021    RBC 4.25 10/27/2021    HGB 13.0 10/27/2021    HCT 41.6 10/27/2021    MCV 97.9 10/27/2021    RDW 14.0 10/27/2021     10/27/2021       CMP:   Lab Results   Component Value Date     10/27/2021    K 4.9 10/27/2021     10/27/2021    CO2 28 10/27/2021    BUN 10 10/27/2021    CREATININE 0.8 10/27/2021    GFRAA >59 10/27/2021    LABGLOM >60 10/27/2021    GLUCOSE 95 10/27/2021    PROT 6.6 10/27/2021    CALCIUM 9.6 10/27/2021    BILITOT <0.2 10/27/2021    ALKPHOS 116 10/27/2021    AST 15 10/27/2021    ALT 19 10/27/2021       POC Tests: No results for input(s): POCGLU, POCNA, POCK, POCCL, POCBUN, POCHEMO, POCHCT in the last 72 hours.     Coags:   Lab Results   Component Value Date    PROTIME 11.9 10/27/2021    INR 0.85 10/27/2021    APTT 27.3 10/27/2021       HCG (If Applicable): No results found for: PREGTESTUR, PREGSERUM, HCG, HCGQUANT     ABGs: No results found for: PHART, PO2ART, MNR9YHR, IDA2ZIN, BEART, D6AERLCX     Type & Screen (If Applicable):  No results found for: LABABO, LABRH    Drug/Infectious Status (If Applicable):  No results found for: HIV, HEPCAB    COVID-19 Screening (If Applicable): No results found for: COVID19        Anesthesia Evaluation  Patient summary reviewed no history of anesthetic complications:   Airway: Mallampati: I  TM distance: >3 FB   Neck ROM: full  Mouth opening: > = 3 FB Dental:    (+) upper dentures      Pulmonary:normal exam  breath sounds clear to auscultation      (-) asthma, recent URI, sleep apnea and not a current smoker          Patient did not smoke on day of surgery. Cardiovascular:  Exercise tolerance: good (>4 METS),   (+) hypertension:,     (-) pacemaker, past MI, CABG/stent and  angina    ECG reviewed  Rhythm: regular  Rate: normal           Beta Blocker:  Dose within 24 Hrs         Neuro/Psych:      (-) seizures, TIA and CVA           GI/Hepatic/Renal:   (+) GERD: well controlled,      (-) liver disease and no renal disease       Endo/Other:        (-) diabetes mellitus, hypothyroidism, hyperthyroidism               Abdominal:             Vascular:     - DVT. Other Findings:             Anesthesia Plan      general     ASA 2     (Preop famotidine, dexamethasone)  Induction: intravenous. MIPS: Postoperative opioids intended and Prophylactic antiemetics administered. Anesthetic plan and risks discussed with patient.                       Silvina Smiley MD   11/3/2021

## 2021-11-03 NOTE — OP NOTE
Operative Note    NEUROSURGICAL DEPARTMENT REPORT     NAME OF SURGEON/: ANTONIO CARTER DO     DATE OF SERVICE: 11/3/2021     PREOPERATIVE DIAGNOSES    1. Lumbar spinal stenosis L2-3 with radiculopathy, recalcitrant to nonoperatively treatments. POSTOPERATIVE DIAGNOSES    same     OPERATIVE PROCEDURES  L2 and L3 hemilaminotomies with L2-3 medial facetectomy  for decompression of the thecal sac and nerve roots utilizing the operating microscope,the tubular retractor and microdissection technique. SURGEON  Vanessa Paredes. Nazia Washington DO     FIRST ASSIST    JOANN Antunez    Estimated blood loss:  Minimal    HISTORY & INDICATIONS  Maksim Mcgee is a 56 y.o. female who presents with right buttock pain that has been ongoing for about a year and a half but worsening. The pain does radiate into the RIGHT anterior thigh. Her pain is mostly located in the RLE. No real back pain. The patient denies numbness. Any sort of physical activity exacerbates the pain. MRI revealed severe lateral recess stenosis at L2-3. After a trial of non-operative treatments without any results the patient elected to move forward with surgical intervention. DESCRIPTION OF PROCEDURE    The patient was brought to the operating room where general endotracheal anesthesia was introduced was positioned on the operating table in the prone position on the open axis table. All pressure points were carefully checked and padded. The C-arm fluoroscope was positioned and draped. Operating microscope was positioned and draped. The lumbosacral region was clipped and prepared for 10 minutes with Betadine scrub, Betadine paint and DuraPrep. The patient was draped in the usual sterile fashion. After the patient's skin was prepped and draped, the level of the L2-3 disc space was localized fluoroscopically utilizing a 22-gauge needle. A 16 mm incision was made 1.5 cm off midline at this level. Incision was carried out to subcutaneous tissue. The underlying fascia was penetrated with a K wire. Sequential dilators were inserted over the K wire followed by placement of a 4 cm length 16 mm diameter tubular retractor. A tubular retractor was connected to the articulating arm. The dilators were removed the operating microscope was bought brought into the field. Under microscopic view, L2 and L3 hemilaminotomies as well as a L2-3 medial facetectomy was carried out. The facet was noted to be significantly overgrown producing severe lateral recess stenosis. Bone removal was performed utilizing various Kerrison rongeurs as well as the high-speed drill. The ligamentum flavum was opened and was resected with a fine-angled Kerrison rongeur. This allowed exposure of the dural sac and the nerve roots. Inspection with the ball tip probe revealed that the thecal sac and nerve roots to be completely decompressed. The wound was then copiously irrigated with antibiotic solution and meticulous hemostasis was assured. 40 mL of Depo-Medrol was applied. The tubular retractor was removed. The wound was closed in layered fashion. A sterile dressing was applied. The patient was returned to the stretcher in the supine position and extubated. The patient was then returned recovery room in a stable condition.

## 2021-11-03 NOTE — BRIEF OP NOTE
Brief Postoperative Note      Patient: Aggie Burt  YOB: 1960  MRN: [id number]    Date of Procedure: 11/3/2021    Pre-Op Diagnosis: Lumbar spinal stenosis at L2-3 with radiculopathy    Post-Op Diagnosis: Same       Procedure(s):  RIGHT L2-3 decompressive laminectomy using minimally invasive technique    Surgeon(s):  Isa Bee DO    Assistant:  * No surgical staff found *    Anesthesia: General    Estimated Blood Loss (mL): less than 50     Complications: None    Specimens:   * No specimens in log *    Implants:  * No implants in log *      Drains: * No LDAs found *    Findings: Large medial facet hypertrophy at L2-3 noted. Nerve root decompressed nicely. No issues.      Electronically signed by Isa Bee DO on 11/3/2021 at 12:25 PM

## 2021-11-10 ENCOUNTER — OFFICE VISIT (OUTPATIENT)
Dept: NEUROSURGERY | Age: 61
End: 2021-11-10

## 2021-11-10 VITALS
HEART RATE: 62 BPM | WEIGHT: 129 LBS | OXYGEN SATURATION: 100 % | DIASTOLIC BLOOD PRESSURE: 67 MMHG | BODY MASS INDEX: 23.74 KG/M2 | SYSTOLIC BLOOD PRESSURE: 120 MMHG | HEIGHT: 62 IN

## 2021-11-10 DIAGNOSIS — Z48.89 ENCOUNTER FOR POST SURGICAL WOUND CHECK: Primary | ICD-10-CM

## 2021-11-10 DIAGNOSIS — G89.18 POST-OPERATIVE PAIN: ICD-10-CM

## 2021-11-10 DIAGNOSIS — Z98.890 S/P LAMINECTOMY: ICD-10-CM

## 2021-11-10 PROCEDURE — 99024 POSTOP FOLLOW-UP VISIT: CPT | Performed by: NURSE PRACTITIONER

## 2021-11-10 RX ORDER — HYDROCODONE BITARTRATE AND ACETAMINOPHEN 5; 325 MG/1; MG/1
1 TABLET ORAL EVERY 6 HOURS PRN
Qty: 12 TABLET | Refills: 0 | Status: SHIPPED | OUTPATIENT
Start: 2021-11-10 | End: 2021-11-13

## 2021-11-10 ASSESSMENT — ENCOUNTER SYMPTOMS
EYES NEGATIVE: 1
GASTROINTESTINAL NEGATIVE: 1
RESPIRATORY NEGATIVE: 1

## 2021-11-10 NOTE — PROGRESS NOTES
[facility] Office Visit      Chief Complaint   Patient presents with    Follow-up    Wound Check       HISTORY OF PRESENT ILLNESS:      Linwood Lorenzana is a 56 y.o. female who underwent a RIGHT L2-3 decompressive laminectomy using minimally invasive technique for lumbar canal stenosis on 2021 and now she is 1 week out from her surgery. Prior to surgery she complained of RLE pain that radiated into the right buttock, anterior thigh. Any physical activity would exacerbate the pain. No weakness on exam.      Today she states that the RLE pain is pretty much gone. She does have some discomfort at the surgical site which is expected. No new complaints. Denies any fever, chills, nausea, or vomiting. She is very glad that she had surgery. Past Medical History:   Diagnosis Date    Hyperlipidemia     Hypertension        Past Surgical History:   Procedure Laterality Date     SECTION      CHOLECYSTECTOMY      LUMBAR LAMINECTOMY Right 11/3/2021    RIGHT L2-3 decompressive laminectomy using minimally invasive technique performed by Donel Closs, DO at [facility] OR        Medications    Current Outpatient Medications:     HYDROcodone-acetaminophen (NORCO) 5-325 MG per tablet, Take 1 tablet by mouth every 6 hours as needed for Pain for up to 3 days. Intended supply: 3 days.  Take lowest dose possible to manage pain, Disp: 12 tablet, Rfl: 0    metoprolol succinate (TOPROL XL) 50 MG extended release tablet, Take 100 mg by mouth daily , Disp: , Rfl:     omeprazole (PRILOSEC) 20 MG delayed release capsule, Take 20 mg by mouth daily, Disp: , Rfl:     lisinopril (PRINIVIL;ZESTRIL) 20 MG tablet, Take 20 mg by mouth daily, Disp: , Rfl:     diclofenac (VOLTAREN) 75 MG EC tablet, Take 75 mg by mouth 2 times daily, Disp: , Rfl:     citalopram (CELEXA) 20 MG tablet, Take 20 mg by mouth daily, Disp: , Rfl:     atorvastatin (LIPITOR) 20 MG tablet, Take 20 mg by mouth daily, Disp: , Rfl:     amitriptyline (ELAVIL) 75 MG tablet, Take 75 mg by mouth nightly, Disp: , Rfl:     traMADol (ULTRAM) 50 MG tablet, Take 50 mg by mouth every 6 hours as needed for Pain., Disp: , Rfl:     temazepam (RESTORIL) 15 MG capsule, Take 15 mg by mouth nightly as needed for Sleep., Disp: , Rfl:     gabapentin (NEURONTIN) 300 MG capsule, Take 300 mg by mouth 2 times daily. , Disp: , Rfl:   Patient has no known allergies. Social History  Social History     Tobacco Use   Smoking Status Never Smoker   Smokeless Tobacco Never Used     Social History     Substance and Sexual Activity   Alcohol Use None         History reviewed. No pertinent family history. Review of Systems:  Constitutional: Negative. HENT: Negative. Eyes: Negative. Respiratory: Negative. Cardiovascular: Negative. Gastrointestinal: Negative. Genitourinary: Negative. Musculoskeletal: Negative. Skin: Negative. Neurological: Negative. Endo/Heme/Allergies: Negative. Psychiatric/Behavioral: Negative. PHYSICAL EXAM:  Vitals:    11/10/21 1056   BP: 120/67   Pulse: 62   SpO2: 100%     Constitutional: The patient appears well-developed andwell-nourished. Eyes - conjunctiva normal.  Conjugate gaze  Ear, nose, throat -No scars, masses, or lesions over external nose or ears, no atrophy of tongue  Neck-symmetric, no masses noted, no jugular vein distension  Respiration- chest wall appears symmetric, goodexpansion, normal effort without use of accessory muscles  Musculoskeletal - no significant wasting of muscles noted, no bony deformities, gait no gross ataxia  Extremities-no clubbing, cyanosis or edema  Skin- warm, dry, and intact. No rash, erythema, or pallor.   Psychiatric - mood, affect, and behavior appear normal.     Neurologic Examination  Awake, Alert and oriented x 4  Normal speech pattern, following commands     Motor:  RIGHT:                iliopsoas 5/5               knee flexor 5/5               knee extension 5/5               EHL/dorsiflexion 5/5               plantar flexion 5/5     LEFT:                  iliopsoas 5/5               knee flexor 5/5               knee extension 5/5               EHL/dorsiflexion 5/5               plantar flexion 5/5     No deficits to light touch or pinprick sensation  Reflexes are 2+ and symmetric  No myofacial tenderness to palpation  Normal Gait pattern         Wound:  C/D/I, steri strips still intact, no signs or symptoms of infection noted     DATA and IMAGING:    Lab Results   Component Value Date    WBC 7.0 10/27/2021    HGB 13.0 10/27/2021    HCT 41.6 10/27/2021    MCV 97.9 10/27/2021     10/27/2021     Lab Results   Component Value Date     10/27/2021    K 4.9 10/27/2021     10/27/2021    CO2 28 10/27/2021    BUN 10 10/27/2021    CREATININE 0.8 10/27/2021    GLUCOSE 95 10/27/2021    CALCIUM 9.6 10/27/2021    PROT 6.6 10/27/2021    LABALBU 3.8 10/27/2021    BILITOT <0.2 10/27/2021    ALKPHOS 116 (H) 10/27/2021    AST 15 10/27/2021    ALT 19 10/27/2021    LABGLOM >60 10/27/2021    GFRAA >59 10/27/2021     Lab Results   Component Value Date    INR 0.87 (L) 11/03/2021    INR 0.85 (L) 10/27/2021    PROTIME 12.1 11/03/2021    PROTIME 11.9 (L) 10/27/2021    No results found. ASSESSMENT :    Eliazar Zuniga is a 56 y.o. female who underwent a RIGHT L2-3 decompressive laminectomy using minimally invasive technique for lumbar canal stenosis on 11/03/2021 and now she is 1 week out from her surgery. PLAN:  -Still no lifting more than 5lbs   -Can now shower and get wound wet  -Follow up in 3 weeks             ICD-10-CM    1. Encounter for post surgical wound check  Z48.89    2. S/P laminectomy  Z98.890    3.  Post-operative pain  G89.18 HYDROcodone-acetaminophen (NORCO) 5-325 MG per tablet        Green Brooking, APRN

## 2021-12-02 ENCOUNTER — TELEPHONE (OUTPATIENT)
Dept: NEUROSURGERY | Age: 61
End: 2021-12-02

## 2021-12-02 NOTE — TELEPHONE ENCOUNTER
Patient called and voiced that she needed to cancel her appt with Dr Luke Morjeon on 12/2/2021 because her  has been sick and cannot drive her. Patient was rescheduled to see Dr Sterling Key on 12/28/2021.

## 2023-10-18 ENCOUNTER — OFFICE (OUTPATIENT)
Dept: URBAN - METROPOLITAN AREA CLINIC 67 | Facility: CLINIC | Age: 63
End: 2023-10-18
Payer: COMMERCIAL

## 2023-10-18 VITALS
HEIGHT: 62 IN | HEART RATE: 76 BPM | SYSTOLIC BLOOD PRESSURE: 126 MMHG | OXYGEN SATURATION: 99 % | DIASTOLIC BLOOD PRESSURE: 78 MMHG | WEIGHT: 122.8 LBS

## 2023-10-18 DIAGNOSIS — R05.3 CHRONIC COUGH: ICD-10-CM

## 2023-10-18 DIAGNOSIS — R10.13 EPIGASTRIC PAIN: ICD-10-CM

## 2023-10-18 DIAGNOSIS — K21.9 GASTRO-ESOPHAGEAL REFLUX DISEASE WITHOUT ESOPHAGITIS: ICD-10-CM

## 2023-10-18 PROCEDURE — 99204 OFFICE O/P NEW MOD 45 MIN: CPT | Performed by: INTERNAL MEDICINE

## 2023-10-18 NOTE — SERVICENOTES
I will have her stay on the Omeprazole 40mg/day but have recommended that she stop the Reglan (we discussed the possible rare side effect of Tardive Dyskinesia). 
We will also plan for an EGD (with biopsies for EoE and H. pylori) given her chronic symptoms despite PPI use.

## 2023-10-18 NOTE — SERVICEHPINOTES
Maya Sanchez   is seen for an initial visit today.
br
br She is accompanied by her daughter and reports a long history (15yrs) of reflux/heartburn with associated nausea/dyspepsia. She has been on some type of acid suppression treatment since the onset of her symptoms and currently takes Omeprazole 40mg/day along with Reglan for her nausea/dyspepsia.
br She also endorses a chronic cough (for the past 6-9 months) but no dysphagia, odynophagia, GI tract bleeding symptoms or unexplained weight loss.

## 2024-02-09 ENCOUNTER — OFFICE (OUTPATIENT)
Dept: URBAN - METROPOLITAN AREA PATHOLOGY 10 | Facility: PATHOLOGY | Age: 64
End: 2024-02-09
Payer: MEDICARE

## 2024-02-09 ENCOUNTER — AMBULATORY SURGICAL CENTER (OUTPATIENT)
Dept: URBAN - METROPOLITAN AREA SURGERY 19 | Facility: SURGERY | Age: 64
End: 2024-02-09
Payer: MEDICARE

## 2024-02-09 DIAGNOSIS — K31.89 OTHER DISEASES OF STOMACH AND DUODENUM: ICD-10-CM

## 2024-02-09 DIAGNOSIS — K31.7 POLYP OF STOMACH AND DUODENUM: ICD-10-CM

## 2024-02-09 DIAGNOSIS — K21.9 GASTRO-ESOPHAGEAL REFLUX DISEASE WITHOUT ESOPHAGITIS: ICD-10-CM

## 2024-02-09 DIAGNOSIS — K22.89 OTHER SPECIFIED DISEASE OF ESOPHAGUS: ICD-10-CM

## 2024-02-09 LAB
RELEVANT H&P ENDOSCOPY: (no result)
RELEVANT H&P ENDOSCOPY: (no result)

## 2024-02-09 PROCEDURE — 88305 TISSUE EXAM BY PATHOLOGIST: CPT | Mod: TC | Performed by: STUDENT IN AN ORGANIZED HEALTH CARE EDUCATION/TRAINING PROGRAM

## 2024-02-09 PROCEDURE — 43239 EGD BIOPSY SINGLE/MULTIPLE: CPT | Performed by: INTERNAL MEDICINE

## (undated) DEVICE — INSTRUMENT 8670005 350 MM GUIDEWRE SHARP

## (undated) DEVICE — MICRO KOVER: Brand: UNBRANDED

## (undated) DEVICE — NEURO CDS

## (undated) DEVICE — DRESSING TRNSPAR W5XL4.5IN FLM SHT SEMIPERMEABLE WIND

## (undated) DEVICE — SPK10281 JACKSON TABLE KIT: Brand: SPK10281 JACKSON TABLE KIT

## (undated) DEVICE — E-Z CLEAN, NON-STICK, PTFE COATED, ELECTROSURGICAL BLADE ELECTRODE, BAYONET, MODIFIED EXTENDED INSULATION, 6.5 INCH (16.5 CM): Brand: MEGADYNE

## (undated) DEVICE — UNDERGLOVE SURG SZ 8 FNGR THK0.21MIL GRN LTX BEAD CUF

## (undated) DEVICE — BAG BND W36XL36IN TRNSPAR POLY GEN PURP W E BND CLSR TIDI

## (undated) DEVICE — E-Z CLEAN, NON-STICK, PTFE COATED, ELECTROSURGICAL BLADE ELECTRODE, MODIFIED EXTENDED INSULATION, 2.5 INCH (6.35 CM): Brand: MEGADYNE

## (undated) DEVICE — SUTURE VCRL SZ 3-0 L18IN ABSRB UD L26MM SH 1/2 CIR J864D

## (undated) DEVICE — TOWEL,OR,DSP,ST,BLUE,DLX,4/PK,20PK/CS: Brand: MEDLINE

## (undated) DEVICE — GLOVE SURG SZ 75 L12IN FNGR THK94MIL TRNSLUC YEL LTX

## (undated) DEVICE — GOWN,PREVENTION PLUS,L,ST,24/CS: Brand: MEDLINE

## (undated) DEVICE — NEEDLE SPNL 22GA L3.5IN BLK HUB S STL REG WALL FIT STYL W/

## (undated) DEVICE — 3M™ STERI-STRIP™ REINFORCED ADHESIVE SKIN CLOSURES, R1547, 1/2 IN X 4 IN (12 MM X 100 MM), 6 STRIPS/ENVELOPE: Brand: 3M™ STERI-STRIP™

## (undated) DEVICE — SUTURE VCRL SZ 2-0 L27IN ABSRB VLT L26MM UR-6 5/8 CIR J602H

## (undated) DEVICE — TIBURON LAPAROTOMY DRAPE: Brand: CONVERTORS

## (undated) DEVICE — MASTISOL ADHESIVE LIQ 2/3ML

## (undated) DEVICE — C-ARMOR C-ARM EQUIPMENT COVERS CLEAR STERILE UNIVERSAL FIT 12 PER CASE: Brand: C-ARMOR

## (undated) DEVICE — TOOL T12MH25L LGD 12CM 2.5MM MATCH LG: Brand: MIDAS REX®